# Patient Record
Sex: FEMALE | Race: WHITE | Employment: FULL TIME | URBAN - METROPOLITAN AREA
[De-identification: names, ages, dates, MRNs, and addresses within clinical notes are randomized per-mention and may not be internally consistent; named-entity substitution may affect disease eponyms.]

---

## 2019-06-18 ENCOUNTER — OFFICE VISIT (OUTPATIENT)
Dept: OBGYN | Facility: CLINIC | Age: 19
End: 2019-06-18
Payer: COMMERCIAL

## 2019-06-18 VITALS
WEIGHT: 110 LBS | BODY MASS INDEX: 20.77 KG/M2 | SYSTOLIC BLOOD PRESSURE: 125 MMHG | HEART RATE: 74 BPM | DIASTOLIC BLOOD PRESSURE: 82 MMHG | OXYGEN SATURATION: 99 % | HEIGHT: 61 IN

## 2019-06-18 DIAGNOSIS — Z30.09 GENERAL COUNSELING AND ADVICE FOR CONTRACEPTIVE MANAGEMENT: Primary | ICD-10-CM

## 2019-06-18 PROCEDURE — 99203 OFFICE O/P NEW LOW 30 MIN: CPT | Performed by: OBSTETRICS & GYNECOLOGY

## 2019-06-18 ASSESSMENT — MIFFLIN-ST. JEOR: SCORE: 1201.34

## 2019-06-18 NOTE — PROGRESS NOTES
CC/HPI:  19 year old  presents for discussion of contraception  In college at [place], home for the summer.   On Depo x >5 years. Enjoys amenorrhea on this method. Aware of effects of depo long-term on bone mass.  Is not able to remember birth control pills.     Past Medical History:   Diagnosis Date     NO ACTIVE PROBLEMS        Past Surgical History:   Procedure Laterality Date     DEBRIDEMENT SKIN AND SUBCUTANEOUS TISSUE      bug bite, age 8     LASIK BILATERAL         No family history on file.    Social History     Socioeconomic History     Marital status: Single     Spouse name: Not on file     Number of children: Not on file     Years of education: Not on file     Highest education level: Not on file   Occupational History     Not on file   Social Needs     Financial resource strain: Not on file     Food insecurity:     Worry: Not on file     Inability: Not on file     Transportation needs:     Medical: Not on file     Non-medical: Not on file   Tobacco Use     Smoking status: Never Smoker     Smokeless tobacco: Never Used     Tobacco comment: no secondhand smoke exposure   Substance and Sexual Activity     Alcohol use: Yes     Comment: soc.      Drug use: Never     Sexual activity: Yes     Partners: Male     Birth control/protection: Injection   Lifestyle     Physical activity:     Days per week: Not on file     Minutes per session: Not on file     Stress: Not on file   Relationships     Social connections:     Talks on phone: Not on file     Gets together: Not on file     Attends [employer] service: Not on file     Active member of club or organization: Not on file     Attends meetings of clubs or organizations: Not on file     Relationship status: Not on file     Intimate partner violence:     Fear of current or ex partner: Not on file     Emotionally abused: Not on file     Physically abused: Not on file     Forced sexual activity: Not on file   Other Topics Concern     Not on file   Social  "History Narrative     Not on file         Current Outpatient Medications:      Ibuprofen (ADVIL) 200 MG capsule, Take 200 mg by mouth every 4 hours as needed. 2 tablets as needed, Disp: , Rfl:     No Known Allergies    Exam:  Vitals:    19 1625   BP: 125/82   Pulse: 74   SpO2: 99%   Weight: 49.9 kg (110 lb)   Height: 1.549 m (5' 1\")     Body mass index is 20.78 kg/m .     Exam:  Constitutional: healthy, alert and no distress  Head: Normocephalic. No masses, lesions, tenderness or abnormalities  Psychiatric: mentation appears normal and affect normal/bright      A/P:  19 year old  for contraceptive consult.     - Discussed Paragard IUD: mechanism of action, duration, bleeding profile, side effects, efficacy. Discussed expulsion, perforation, risk of bowel erosion if perforation undetected, risk of embedment.  - Discussed Mirena IUD: mechanism of action, duration, bleeding profile, side effects, efficacy. Discussed expulsion, perforation. Discussed minimal systemic hormonal absorption.  - Discussed OCPs, Patch, NuvaRing. Discussed side effects, efficacy, delivery system. Discussed missed doses.   - Discussed Nexplanon: mechanism of action, duration, bleeding profile, side effects, efficacy.  - Discussed barrier methods: condoms, cervical cap, diaphragm.   - Discussed that only condoms can prevent STI transmission of certain STIs.  S/p HPV vaccine   - At conclusion of consultation patient feels she will proceed with Mirena IUD. Written information provided about Mirena.    Greater than 50% of this 30 minute appointment was spent in counseling on contraception.        "

## 2019-06-18 NOTE — NURSING NOTE
"Chief Complaint   Patient presents with     Contraception       Initial /82   Pulse 74   Ht 1.549 m (5' 1\")   Wt 49.9 kg (110 lb)   LMP  (LMP Unknown)   SpO2 99%   Breastfeeding? No   BMI 20.78 kg/m   Estimated body mass index is 20.78 kg/m  as calculated from the following:    Height as of this encounter: 1.549 m (5' 1\").    Weight as of this encounter: 49.9 kg (110 lb).  BP completed using cuff size: regular    Questioned patient about current smoking habits.  Pt. has never smoked.      No obstetric history on file.    The following HM Due: pap smear  Maryam (13-24)      The following patient reported/Care Every where data was sent to:  P ABSTRACT QUALITY INITIATIVES [[id number]]  n/a      patient has appointment for today              "

## 2019-07-03 ENCOUNTER — OFFICE VISIT (OUTPATIENT)
Dept: OBGYN | Facility: CLINIC | Age: 19
End: 2019-07-03
Payer: COMMERCIAL

## 2019-07-03 VITALS
BODY MASS INDEX: 20.39 KG/M2 | OXYGEN SATURATION: 100 % | HEIGHT: 61 IN | SYSTOLIC BLOOD PRESSURE: 121 MMHG | WEIGHT: 108 LBS | DIASTOLIC BLOOD PRESSURE: 79 MMHG | HEART RATE: 85 BPM

## 2019-07-03 DIAGNOSIS — Z11.3 SCREEN FOR STD (SEXUALLY TRANSMITTED DISEASE): ICD-10-CM

## 2019-07-03 DIAGNOSIS — Z12.4 SCREENING FOR MALIGNANT NEOPLASM OF CERVIX: ICD-10-CM

## 2019-07-03 DIAGNOSIS — Z30.430 ENCOUNTER FOR IUD INSERTION: Primary | ICD-10-CM

## 2019-07-03 LAB — HCG UR QL: NEGATIVE

## 2019-07-03 PROCEDURE — 87491 CHLMYD TRACH DNA AMP PROBE: CPT | Performed by: OBSTETRICS & GYNECOLOGY

## 2019-07-03 PROCEDURE — G0145 SCR C/V CYTO,THINLAYER,RESCR: HCPCS | Performed by: OBSTETRICS & GYNECOLOGY

## 2019-07-03 PROCEDURE — 87591 N.GONORRHOEAE DNA AMP PROB: CPT | Performed by: OBSTETRICS & GYNECOLOGY

## 2019-07-03 PROCEDURE — 58300 INSERT INTRAUTERINE DEVICE: CPT | Performed by: OBSTETRICS & GYNECOLOGY

## 2019-07-03 PROCEDURE — 81025 URINE PREGNANCY TEST: CPT | Performed by: OBSTETRICS & GYNECOLOGY

## 2019-07-03 ASSESSMENT — MIFFLIN-ST. JEOR: SCORE: 1192.26

## 2019-07-03 NOTE — NURSING NOTE
"Chief Complaint   Patient presents with     Contraception     MIRENA IUD.        Initial /79   Pulse 85   Ht 1.549 m (5' 1\")   Wt 49 kg (108 lb)   LMP  (LMP Unknown)   SpO2 100%   Breastfeeding? No   BMI 20.41 kg/m   Estimated body mass index is 20.41 kg/m  as calculated from the following:    Height as of this encounter: 1.549 m (5' 1\").    Weight as of this encounter: 49 kg (108 lb).  BP completed using cuff size: regular    Questioned patient about current smoking habits.  Pt. has never smoked.          The following HM Due: NONE      The following patient reported/Care Every where data was sent to:  P ABSTRACT QUALITY INITIATIVES [[id number]]  N/A      patient has appointment for today              "

## 2019-07-03 NOTE — LETTER
July 9, 2019      Sherri Willard  [address]  [place] [facility] [place] [address]    Dear ,      I am happy to inform you that your recent cervical cancer screening test (PAP smear) was normal.      Preventative screenings such as this help to ensure your health for years to come. You should repeat a pap smear in 3 years, unless otherwise directed.      You will still need to return to the clinic every year for your annual exam and other preventive tests.     If you have additional questions regarding this result, please call our registered nurse, Denise at [phone].      Sincerely,      Johanny Leung MD/micky

## 2019-07-03 NOTE — PROGRESS NOTES
IUD INSERTION PROCEDURE    Sherri Willard is a 19 year old female  who presents for Mirena IUD insertion.  Indication for IUD insertion is contraception.  No LMP recorded (lmp unknown). Patient has had an injection. .  The patient is currently using depoprovera for contraception.  She is in a monogamous sexual relationship.     No results found for: PAP  GC/CT todya  Results for orders placed or performed in visit on 19   HCG qualitative urine   Result Value Ref Range    HCG Qual Urine Negative NEG^Negative       A complete discussion of the risks and benefits of IUD use and the details of the insertion procedure was held with the patient.    All questions were answered.  A consent form was signed.    Prior to the beginning of the procedure the team paused to verify the patient's identity, as well as the procedure to be performed and the site.  All equipment required was ready and available. The patient was positioned appropriately.     IUD Lot # LOT: [id number] EXP: 2021 NDC: 61793-663-32.    The patient was placed in low lithotomy.  A bimanual exam was performed and the uterus noted to be retroverted.  A speculum was placed and the cervix swabbed with Betadine.  A tenaculum was placed on the anterior cervical lip. A paracervical block was performed.  The fundus sounded to 7 cm. The Mirena IUD was placed to the uterine fundus without difficulty.  The strings were cut to 3 cms.  The tenaculum was removed and hemostasis was ensured.  The speculum was removed.  The patient tolerated the procedure well.    PLAN:   The patient was asked to contact the clinic for any fever/chills/severe pelvic or abdominal pain or heavy bleeding. She was instructed in how to palpate her IUD strings.    FOLLOW-UP:  She was asked to follow up prn, and for her routine annual screening.

## 2019-07-04 LAB
C TRACH DNA SPEC QL NAA+PROBE: NEGATIVE
N GONORRHOEA DNA SPEC QL NAA+PROBE: NEGATIVE
SPECIMEN SOURCE: NORMAL
SPECIMEN SOURCE: NORMAL

## 2019-07-08 LAB
COPATH REPORT: NORMAL
PAP: NORMAL

## 2022-06-07 ENCOUNTER — OFFICE VISIT (OUTPATIENT)
Dept: URGENT CARE | Facility: URGENT CARE | Age: 22
End: 2022-06-07
Payer: COMMERCIAL

## 2022-06-07 VITALS
HEART RATE: 107 BPM | DIASTOLIC BLOOD PRESSURE: 89 MMHG | WEIGHT: 125 LBS | RESPIRATION RATE: 16 BRPM | SYSTOLIC BLOOD PRESSURE: 134 MMHG | BODY MASS INDEX: 23.62 KG/M2 | TEMPERATURE: 98.5 F | OXYGEN SATURATION: 98 %

## 2022-06-07 DIAGNOSIS — L50.9 URTICARIA: ICD-10-CM

## 2022-06-07 DIAGNOSIS — L27.0 DRUG RASH: Primary | ICD-10-CM

## 2022-06-07 PROCEDURE — 99203 OFFICE O/P NEW LOW 30 MIN: CPT | Performed by: NURSE PRACTITIONER

## 2022-06-07 ASSESSMENT — PAIN SCALES - GENERAL: PAINLEVEL: NO PAIN (0)

## 2022-06-07 NOTE — PATIENT INSTRUCTIONS
Recommend zyrtec 10 mg daily for 14 days or may take benadryl 25 mg at bedtime as this may cause drowsiness.

## 2022-06-07 NOTE — PROGRESS NOTES
Assessment & Plan     1. Drug rash      2. Urticaria    Verbalized understanding; will monitor symptoms closely. Reviewed s/e to medications. If symptoms of oral swelling or Dyspnea occur will go to ER    Letter given for work    Patient Instructions   Recommend zyrtec 10 mg daily for 14 days or may take benadryl 25 mg at bedtime as this may cause drowsiness.         Maile Kelly, AYDEN [facility]    Ace Polanco is a 22 year old female who presents to clinic today for the following health issues:  Chief Complaint   Patient presents with     Rash     Just got done taking amoxicillin for wisdom teeth has not changed anything else but has a rash on bilteral legs and chest started yesterday     HPI    Patient states she started to have symptoms of a rash about 2 days ago bilateral feet and lower legs this is now spreading up both legs and trunk front and back is described a very mildly pruritic.denies oral swelling, SOB or dyspnea        Review of Systems  Constitutional, HEENT, cardiovascular, pulmonary, gi and gu systems are negative, except as otherwise noted.      Objective    /89   Pulse 107   Temp 98.5  F (36.9  C) (Tympanic)   Resp 16   Wt 56.7 kg (125 lb)   SpO2 98%   BMI 23.62 kg/m    Physical Exam   GENERAL: healthy, alert and no distress  NECK: no adenopathy, no asymmetry, masses, or scars and thyroid normal to palpation  RESP: lungs clear to auscultation - no rales, rhonchi or wheezes  CV: regular rate and rhythm, normal S1 S2, no S3 or S4, no murmur, click or rub, no peripheral edema and peripheral pulses strong  ABDOMEN: soft, nontender, no hepatosplenomegaly, no masses and bowel sounds normal  MS: no gross musculoskeletal defects noted, no edema  SKIN: macular papular rash lower extremities trunk anterior and posterior. Areas of wheals. Dry. Flat.

## 2022-06-07 NOTE — LETTER
[facility] [place]  [address] [facility] [address]  Phone: [phone]    June 7, 2022        Sherri Willard  [address] [address]          To whom it may concern:    RE: Sherri Willard    Patient was seen and treated today at our clinic for a rash related to a recent medication. This is a non contagious rash.     Please contact me for questions or concerns.      Sincerely,        AYDEN Fischer CNP

## 2022-06-11 ENCOUNTER — HEALTH MAINTENANCE LETTER (OUTPATIENT)
Age: 22
End: 2022-06-11

## 2022-10-22 ENCOUNTER — HEALTH MAINTENANCE LETTER (OUTPATIENT)
Age: 22
End: 2022-10-22

## 2023-06-18 ENCOUNTER — HEALTH MAINTENANCE LETTER (OUTPATIENT)
Age: 23
End: 2023-06-18

## 2024-08-05 ENCOUNTER — LAB REQUISITION (OUTPATIENT)
Dept: LAB | Facility: CLINIC | Age: 24
End: 2024-08-05
Payer: COMMERCIAL

## 2024-08-05 DIAGNOSIS — Z12.4 ENCOUNTER FOR SCREENING FOR MALIGNANT NEOPLASM OF CERVIX: ICD-10-CM

## 2024-08-05 PROCEDURE — G0145 SCR C/V CYTO,THINLAYER,RESCR: HCPCS | Mod: ORL | Performed by: ADVANCED PRACTICE MIDWIFE

## 2024-08-09 LAB
BKR LAB AP GYN ADEQUACY: NORMAL
BKR LAB AP GYN INTERPRETATION: NORMAL
BKR LAB AP HPV REFLEX: NO
BKR LAB AP LMP: NORMAL
BKR LAB AP PREVIOUS ABNL DX: NORMAL
BKR LAB AP PREVIOUS ABNORMAL: NORMAL
PATH REPORT.COMMENTS IMP SPEC: NORMAL
PATH REPORT.COMMENTS IMP SPEC: NORMAL
PATH REPORT.RELEVANT HX SPEC: NORMAL

## 2024-08-11 ENCOUNTER — HEALTH MAINTENANCE LETTER (OUTPATIENT)
Age: 24
End: 2024-08-11